# Patient Record
Sex: MALE | Race: WHITE | NOT HISPANIC OR LATINO | URBAN - METROPOLITAN AREA
[De-identification: names, ages, dates, MRNs, and addresses within clinical notes are randomized per-mention and may not be internally consistent; named-entity substitution may affect disease eponyms.]

---

## 2017-02-23 ENCOUNTER — EMERGENCY (EMERGENCY)
Facility: HOSPITAL | Age: 77
LOS: 1 days | Discharge: HOME | End: 2017-02-23
Attending: EMERGENCY MEDICINE | Admitting: EMERGENCY MEDICINE
Payer: MEDICARE

## 2017-02-23 VITALS
TEMPERATURE: 98 F | OXYGEN SATURATION: 100 % | RESPIRATION RATE: 16 BRPM | DIASTOLIC BLOOD PRESSURE: 72 MMHG | HEART RATE: 65 BPM | SYSTOLIC BLOOD PRESSURE: 150 MMHG

## 2017-02-23 LAB
ALBUMIN SERPL ELPH-MCNC: 4.2 G/DL — SIGNIFICANT CHANGE UP (ref 3.3–5)
ALP SERPL-CCNC: 70 U/L — SIGNIFICANT CHANGE UP (ref 40–120)
ALT FLD-CCNC: 19 U/L — SIGNIFICANT CHANGE UP (ref 4–41)
AST SERPL-CCNC: 20 U/L — SIGNIFICANT CHANGE UP (ref 4–40)
BASOPHILS # BLD AUTO: 0.05 K/UL — SIGNIFICANT CHANGE UP (ref 0–0.2)
BASOPHILS NFR BLD AUTO: 0.8 % — SIGNIFICANT CHANGE UP (ref 0–2)
BILIRUB SERPL-MCNC: 0.3 MG/DL — SIGNIFICANT CHANGE UP (ref 0.2–1.2)
BUN SERPL-MCNC: 26 MG/DL — HIGH (ref 7–23)
CALCIUM SERPL-MCNC: 9.2 MG/DL — SIGNIFICANT CHANGE UP (ref 8.4–10.5)
CHLORIDE SERPL-SCNC: 104 MMOL/L — SIGNIFICANT CHANGE UP (ref 98–107)
CK MB BLD-MCNC: 2.37 NG/ML — SIGNIFICANT CHANGE UP (ref 1–6.6)
CK MB BLD-MCNC: 2.83 NG/ML — SIGNIFICANT CHANGE UP (ref 1–6.6)
CK MB BLD-MCNC: SIGNIFICANT CHANGE UP (ref 0–2.5)
CK SERPL-CCNC: 115 U/L — SIGNIFICANT CHANGE UP (ref 30–200)
CK SERPL-CCNC: 164 U/L — SIGNIFICANT CHANGE UP (ref 30–200)
CO2 SERPL-SCNC: 23 MMOL/L — SIGNIFICANT CHANGE UP (ref 22–31)
CREAT SERPL-MCNC: 1.07 MG/DL — SIGNIFICANT CHANGE UP (ref 0.5–1.3)
D DIMER BLD IA.RAPID-MCNC: < 150 NG/ML — SIGNIFICANT CHANGE UP
EOSINOPHIL # BLD AUTO: 0.31 K/UL — SIGNIFICANT CHANGE UP (ref 0–0.5)
EOSINOPHIL NFR BLD AUTO: 4.9 % — SIGNIFICANT CHANGE UP (ref 0–6)
GLUCOSE SERPL-MCNC: 96 MG/DL — SIGNIFICANT CHANGE UP (ref 70–99)
HCT VFR BLD CALC: 43 % — SIGNIFICANT CHANGE UP (ref 39–50)
HGB BLD-MCNC: 14.3 G/DL — SIGNIFICANT CHANGE UP (ref 13–17)
IMM GRANULOCYTES NFR BLD AUTO: 0 % — SIGNIFICANT CHANGE UP (ref 0–1.5)
LYMPHOCYTES # BLD AUTO: 1.55 K/UL — SIGNIFICANT CHANGE UP (ref 1–3.3)
LYMPHOCYTES # BLD AUTO: 24.4 % — SIGNIFICANT CHANGE UP (ref 13–44)
MCHC RBC-ENTMCNC: 29.2 PG — SIGNIFICANT CHANGE UP (ref 27–34)
MCHC RBC-ENTMCNC: 33.3 % — SIGNIFICANT CHANGE UP (ref 32–36)
MCV RBC AUTO: 87.9 FL — SIGNIFICANT CHANGE UP (ref 80–100)
MONOCYTES # BLD AUTO: 0.47 K/UL — SIGNIFICANT CHANGE UP (ref 0–0.9)
MONOCYTES NFR BLD AUTO: 7.4 % — SIGNIFICANT CHANGE UP (ref 2–14)
NEUTROPHILS # BLD AUTO: 3.96 K/UL — SIGNIFICANT CHANGE UP (ref 1.8–7.4)
NEUTROPHILS NFR BLD AUTO: 62.5 % — SIGNIFICANT CHANGE UP (ref 43–77)
NT-PROBNP SERPL-SCNC: 70.66 PG/ML — SIGNIFICANT CHANGE UP
PLATELET # BLD AUTO: 170 K/UL — SIGNIFICANT CHANGE UP (ref 150–400)
PMV BLD: 10.3 FL — SIGNIFICANT CHANGE UP (ref 7–13)
POTASSIUM SERPL-MCNC: 4.4 MMOL/L — SIGNIFICANT CHANGE UP (ref 3.5–5.3)
POTASSIUM SERPL-SCNC: 4.4 MMOL/L — SIGNIFICANT CHANGE UP (ref 3.5–5.3)
PROT SERPL-MCNC: 6.6 G/DL — SIGNIFICANT CHANGE UP (ref 6–8.3)
RBC # BLD: 4.89 M/UL — SIGNIFICANT CHANGE UP (ref 4.2–5.8)
RBC # FLD: 13.8 % — SIGNIFICANT CHANGE UP (ref 10.3–14.5)
SODIUM SERPL-SCNC: 141 MMOL/L — SIGNIFICANT CHANGE UP (ref 135–145)
TROPONIN T SERPL-MCNC: < 0.06 NG/ML — SIGNIFICANT CHANGE UP (ref 0–0.06)
TROPONIN T SERPL-MCNC: < 0.06 NG/ML — SIGNIFICANT CHANGE UP (ref 0–0.06)
WBC # BLD: 6.34 K/UL — SIGNIFICANT CHANGE UP (ref 3.8–10.5)
WBC # FLD AUTO: 6.34 K/UL — SIGNIFICANT CHANGE UP (ref 3.8–10.5)

## 2017-02-23 PROCEDURE — 99218: CPT | Mod: GC

## 2017-02-23 PROCEDURE — 71020: CPT | Mod: 26

## 2017-02-23 RX ORDER — ASPIRIN/CALCIUM CARB/MAGNESIUM 324 MG
162 TABLET ORAL ONCE
Qty: 0 | Refills: 0 | Status: COMPLETED | OUTPATIENT
Start: 2017-02-23 | End: 2017-02-23

## 2017-02-23 RX ADMIN — Medication 162 MILLIGRAM(S): at 18:29

## 2017-02-23 NOTE — ED PROVIDER NOTE - OBJECTIVE STATEMENT
76yM hx htn, hld p/w sudden onset pressure under left "breastbone" that began while he was watching basketball. Has never had similar pain before. No associated sob, nausea, or diaphoresis, pain constant since last night but alleviated slightly since hospital arrival. Took 81mg home aspirin last night. No aspirin taken today. No personal or family hx heart dz.

## 2017-02-23 NOTE — ED PROVIDER NOTE - PSH
Bilateral inguinal hernia  1976  Cataract  bilateral  H/O laminectomy  secondary to spinal stenosis 3/2012

## 2017-02-23 NOTE — ED PROVIDER NOTE - MEDICAL DECISION MAKING DETAILS
75 y/o M with h/o HTN, HLD presents with left sided chest pain. R/o PE and ACS. D-dimer and if positive will CTA. If negative will admit for ACS r/o-stress test. Moderate risk heart score 4.

## 2017-02-23 NOTE — ED ADULT NURSE NOTE - CHIEF COMPLAINT QUOTE
Patient sent in by PMD for evaluation of midsternal chest tightness ("under breast bone"). Denies SOB.

## 2017-02-23 NOTE — ED PROVIDER NOTE - PROGRESS NOTE DETAILS
Dr. Joy who is on call for Dr. Perkins notified that patient will be staying in CDU for further evaluation of chest pain.

## 2017-02-23 NOTE — ED PROVIDER NOTE - PHYSICAL EXAMINATION
Attending Note: 75 y/o male p/w sudden onset pressure under left "breastbone" that began while he was watching x 24 hours. PMH HTN. Never had similar pain. No associated SOB, nausea, diaphoresis, syncope, exercise intolerance, dizziness, not increased with ambulation or climbing stars. Pain constant since last night, alleviated slightly since hospital arrival. +81mg home ASA x last night. No ASA taken today. No personal or family hx heart disease or stroke.

## 2017-02-23 NOTE — ED ADULT NURSE NOTE - OBJECTIVE STATEMENT
Break RN: 77 y/o male pt, aox3, ambulatory, accompanied by wife, brought to [facility], referred to the ED by PMD for c/o constant midsternal pressure/tightness-pt says its under the breastbone, started last night, rates pain now at 4/10, was 8/10 last night. Pt had episodes of similar pain but today is more intense. Pt denies SOB, abd pain, fever/chills, n/v/d. Md ballesteros ongoing, SL placed, labs sent, NAD, will cont to monitor

## 2017-02-23 NOTE — ED PROVIDER NOTE - ATTENDING CONTRIBUTION TO CARE
I performed a face to face evaluation of this patient and performed a full history and physical examination on the patient.  I agree and have discussed with the resident their history, physical examination, general assessment, and proposed medical plan.

## 2017-02-24 VITALS
HEART RATE: 74 BPM | TEMPERATURE: 98 F | DIASTOLIC BLOOD PRESSURE: 48 MMHG | SYSTOLIC BLOOD PRESSURE: 114 MMHG | OXYGEN SATURATION: 97 % | RESPIRATION RATE: 16 BRPM

## 2017-02-24 LAB — HBA1C BLD-MCNC: 5.7 % — HIGH (ref 4–5.6)

## 2017-02-24 PROCEDURE — 93018 CV STRESS TEST I&R ONLY: CPT | Mod: GC

## 2017-02-24 PROCEDURE — 99217: CPT | Mod: GC

## 2017-02-24 PROCEDURE — 93016 CV STRESS TEST SUPVJ ONLY: CPT | Mod: GC

## 2017-02-24 PROCEDURE — 78452 HT MUSCLE IMAGE SPECT MULT: CPT | Mod: 26

## 2017-02-24 RX ORDER — ATORVASTATIN CALCIUM 80 MG/1
20 TABLET, FILM COATED ORAL AT BEDTIME
Qty: 0 | Refills: 0 | Status: DISCONTINUED | OUTPATIENT
Start: 2017-02-24 | End: 2017-02-27

## 2017-02-24 RX ORDER — FINASTERIDE 5 MG/1
5 TABLET, FILM COATED ORAL DAILY
Qty: 0 | Refills: 0 | Status: DISCONTINUED | OUTPATIENT
Start: 2017-02-24 | End: 2017-02-27

## 2017-02-24 RX ADMIN — FINASTERIDE 5 MILLIGRAM(S): 5 TABLET, FILM COATED ORAL at 12:28

## 2017-02-24 NOTE — ED CDU PROVIDER NOTE - PLAN OF CARE
Follow up with your PMD and/or cardiologist within 48-72 hours. Show copies of your reports given to you. Recommend Aspirin 81mg over the counter daily until further evaluation.  Take all of your other medications as previously prescribed. Worsening or continued chest pain, shortness of breath, weakness, return to ED.

## 2017-02-24 NOTE — ED CDU PROVIDER NOTE - PROGRESS NOTE DETAILS
RHETT HOGAN - Pt resting comfortably, asleep. VSS, CE x 2 negative, pending stress in am, will continue to monitor and reassess. CDU RHETT Stallings - Patient states he is feeling better. Chest pain has improved. Stress test done this am. Patient states he had no difficulties with it. Exam: heart- RRR s1s2 nl Lungs - clear bilaterally  abd - soft NT ND extrem- no edema or cyanosis  chest - nontender on palpation.  cardiac enzymes #1 164  #2  155 , Await stress test results. Continue to monitor on telemetry. CDU Attending Discharge Note: Dr. Galeana - Pt has slight discomfort left over", nothing like what brought me in." Vitals stable, clear lungs, nml cardiac, soft non tender abd, no edema, no calf tn.  Stress test negative.  Message left for Dr. Sebastian in office.

## 2017-02-24 NOTE — ED CDU PROVIDER NOTE - CHPI ED SYMPTOMS NEG
no nausea/no vomiting/no chills/no dizziness/no shortness of breath/no fever/no back pain/no syncope/no diaphoresis

## 2017-02-24 NOTE — ED CDU PROVIDER NOTE - PMH
Atonic bladder  self catherization 6 to 7 times a day since 2005  Bilateral hydronephrosis    BPH (benign prostatic hypertrophy)  luts  Erectile dysfunction    Hyperlipidemia    Rotator cuff syndrome    Urinary retention

## 2017-02-24 NOTE — ED CDU PROVIDER NOTE - MEDICAL DECISION MAKING DETAILS
Pt. was evaluated in the ED and sent to CDU for cardiac telemetry monitoring, CE #2, stress, observation and reassessment.

## 2017-02-24 NOTE — ED CDU PROVIDER NOTE - OBJECTIVE STATEMENT
75 y/o M PMH HTN, HLD c/o sternal chest "heaviness" and discomfort since yesterday. Pt is nonexertional and nonradiating, constant however has improved since arriving in ED. Notes infrequent cough. Denies fever, chills, SOB, abdominal pain, LE edema, N/V, diaphoresis, numbness/tingling/weakness. 75 y/o M PMH HTN, HLD c/o sternal chest "heaviness" and discomfort since yesterday. Pt is nonexertional and nonradiating, constant however has improved since arriving in ED. Notes infrequent cough. Not associated with position, deep inhalation, or exertion. Denies fever, chills, SOB, abdominal pain, LE edema, N/V, diaphoresis, numbness/tingling/weakness.

## 2019-08-29 ENCOUNTER — APPOINTMENT (OUTPATIENT)
Dept: ORTHOPEDIC SURGERY | Facility: CLINIC | Age: 79
End: 2019-08-29

## 2021-03-12 ENCOUNTER — TRANSCRIPTION ENCOUNTER (OUTPATIENT)
Age: 81
End: 2021-03-12

## 2021-03-13 ENCOUNTER — TRANSCRIPTION ENCOUNTER (OUTPATIENT)
Age: 81
End: 2021-03-13

## 2022-11-04 NOTE — ED ADULT TRIAGE NOTE - CHIEF COMPLAINT QUOTE
Patient sent in by PMD for evaluation of midsternal chest tightness ("under breast bone"). Denies SOB. Patent

## 2025-07-10 NOTE — ED ADULT NURSE NOTE - DISCHARGE DATE/TIME
I think you likely have gastritis, which is irritation of the lining of the stomach.  You can use famotidine, which is an antiacid, 1 tablet daily indefinitely until you follow-up with a primary care provider.  You can also use Carafate 1 tablet prior to meals as needed if you are having a bad episode of pain.    I placed a referral for a new primary care provider.  You can also try calling [facility] [phone] or [facility] [phone] to schedule with a new PCP.    If you have any worsening symptoms such as worsening pain, nausea, vomiting, bloody stools, fever, please return to the emergency department to be reevaluated.   24-Feb-2017 15:34